# Patient Record
Sex: FEMALE | URBAN - METROPOLITAN AREA
[De-identification: names, ages, dates, MRNs, and addresses within clinical notes are randomized per-mention and may not be internally consistent; named-entity substitution may affect disease eponyms.]

---

## 2023-05-09 ENCOUNTER — APPOINTMENT (RX ONLY)
Dept: URBAN - METROPOLITAN AREA CLINIC 55 | Facility: CLINIC | Age: 28
Setting detail: DERMATOLOGY
End: 2023-05-09

## 2023-05-09 DIAGNOSIS — Z41.9 ENCOUNTER FOR PROCEDURE FOR PURPOSES OTHER THAN REMEDYING HEALTH STATE, UNSPECIFIED: ICD-10-CM

## 2023-05-09 PROCEDURE — ? COSMETIC CONSULTATION: GENERAL

## 2023-05-09 PROCEDURE — ? INVENTORY

## 2023-05-09 PROCEDURE — ? IN-HOUSE DISPENSING PHARMACY

## 2023-05-09 NOTE — PROCEDURE: IN-HOUSE DISPENSING PHARMACY
Product 5 Units Dispensed: 0
Product 62 Unit Type: mg
Product 3 Amount/Unit (Numbers Only): 30
Product 5 Unit Type: ml
Name Of Product 5: Hydrating Tretinoin 0.025% Cream
Product 5 Application Directions: Apply nightly or as directed. Use SPF daily.
Render Refills If Set To 0: Yes
Product 3 Refills: 11
Product 1 Refills: 2
Name Of Product 1: Anti-Aging Brightening Cream
Product 7 Application Directions: Apply only as directed
Product 2 Application Directions: Apply nightly or as directed.
Name Of Product 4: Hydroquinone 8% Emulsion
Name Of Product 3: Acne Triple Combination Gel
Name Of Product 2: Dapsone / Spironolactone Gel
Product 2 Units Dispensed: 1
Name Of Product 6: Rosacea Triple Combination Gel
Detail Level: Zone
Name Of Product 7: Lidocaine 23% / Tetracaine 7% Cream
Send Charges To Patient Encounter: No

## 2023-05-24 ENCOUNTER — APPOINTMENT (RX ONLY)
Dept: URBAN - METROPOLITAN AREA CLINIC 55 | Facility: CLINIC | Age: 28
Setting detail: DERMATOLOGY
End: 2023-05-24

## 2023-05-24 DIAGNOSIS — Z41.9 ENCOUNTER FOR PROCEDURE FOR PURPOSES OTHER THAN REMEDYING HEALTH STATE, UNSPECIFIED: ICD-10-CM

## 2023-05-24 PROCEDURE — ? INVENTORY

## 2023-05-24 PROCEDURE — ? PALOMAR VECTUS LASER HAIR REMOVAL

## 2023-05-24 NOTE — PROCEDURE: PALOMAR VECTUS LASER HAIR REMOVAL
Topical Anesthesia Type: 20% benzocaine, 8% lidocaine, 4% tetracaine
Optic Size: 12 x 12mm
External Cooling Fan Speed: 0
Skintel Number (Optional): 19
Rate (Hz): Medium
Cooling: DCD setting
Pulse Duration (Include Units): 14 ms
Post-Care Instructions: I reviewed with the patient in detail post-care instructions. Patient should avoid sun for a minimum of 4 weeks before and after treatment.
Post-Procedure Care: Immediate endpoint: perifollicular erythema and edema. Post care was reviewed with the patient and all patient questions were answered to their satisfaction.
Treatment Number: 1
Laser Type: diode 810nm
Detail Level: Detailed
Fluence In J/Cm2: 22
Render Post-Care In The Note: No
Pre-Procedure: Prior to proceeding the treatment areas were cleaned and all present put on their eye protection.
Consent obtained, risks reviewed.

## 2023-06-21 ENCOUNTER — APPOINTMENT (RX ONLY)
Dept: URBAN - METROPOLITAN AREA CLINIC 55 | Facility: CLINIC | Age: 28
Setting detail: DERMATOLOGY
End: 2023-06-21

## 2023-06-21 DIAGNOSIS — Z41.9 ENCOUNTER FOR PROCEDURE FOR PURPOSES OTHER THAN REMEDYING HEALTH STATE, UNSPECIFIED: ICD-10-CM

## 2023-06-21 PROCEDURE — ? INVENTORY

## 2023-06-21 PROCEDURE — ? PALOMAR VECTUS LASER HAIR REMOVAL

## 2023-06-21 NOTE — PROCEDURE: PALOMAR VECTUS LASER HAIR REMOVAL
Cooling: DCD setting
Post-Procedure Care: Immediate endpoint: perifollicular erythema and edema. Post care was reviewed with the patient and all patient questions were answered to their satisfaction.
Fluence In J/Cm2: 23
Pulse Duration (Include Units): 15 ms
Treatment Number: 2
Post-Care Instructions: I reviewed with the patient in detail post-care instructions. Patient should avoid sun for a minimum of 4 weeks before and after treatment.
Rate (Hz): Medium
Render Post-Care In The Note: No
Detail Level: Detailed
Optic Size: 12 x 12mm
Skintel Number (Optional): 13
Laser Type: diode 810nm
External Cooling Fan Speed: 0
Pre-Procedure: Prior to proceeding the treatment areas were cleaned and all present put on their eye protection.
Topical Anesthesia Type: 20% benzocaine, 8% lidocaine, 4% tetracaine
Consent obtained, risks reviewed.

## 2023-07-20 ENCOUNTER — APPOINTMENT (RX ONLY)
Dept: URBAN - METROPOLITAN AREA CLINIC 55 | Facility: CLINIC | Age: 28
Setting detail: DERMATOLOGY
End: 2023-07-20

## 2023-07-20 DIAGNOSIS — Z41.9 ENCOUNTER FOR PROCEDURE FOR PURPOSES OTHER THAN REMEDYING HEALTH STATE, UNSPECIFIED: ICD-10-CM

## 2023-07-20 PROCEDURE — ? PALOMAR VECTUS LASER HAIR REMOVAL

## 2023-07-20 PROCEDURE — ? INVENTORY

## 2023-07-20 NOTE — PROCEDURE: PALOMAR VECTUS LASER HAIR REMOVAL
Rate (Hz): Medium
Treatment Number: 3
Cooling: DCD setting
Post-Care Instructions: I reviewed with the patient in detail post-care instructions. Patient should avoid sun for a minimum of 4 weeks before and after treatment.
Fluence In J/Cm2: 24
Pulse Duration (Include Units): 16 ms
Optic Size: 12 x 12mm
Detail Level: Detailed
Topical Anesthesia Type: 20% benzocaine, 8% lidocaine, 4% tetracaine
Pre-Procedure: Prior to proceeding the treatment areas were cleaned and all present put on their eye protection.
Post-Procedure Care: Immediate endpoint: perifollicular erythema and edema. Post care was reviewed with the patient and all patient questions were answered to their satisfaction.
External Cooling Fan Speed: 0
Skintel Number (Optional): 13
Render Post-Care In The Note: No
Laser Type: diode 810nm
Consent obtained, risks reviewed.

## 2023-08-17 ENCOUNTER — APPOINTMENT (RX ONLY)
Dept: URBAN - METROPOLITAN AREA CLINIC 55 | Facility: CLINIC | Age: 28
Setting detail: DERMATOLOGY
End: 2023-08-17

## 2023-08-17 DIAGNOSIS — Z41.9 ENCOUNTER FOR PROCEDURE FOR PURPOSES OTHER THAN REMEDYING HEALTH STATE, UNSPECIFIED: ICD-10-CM

## 2023-08-17 PROCEDURE — ? PALOMAR VECTUS LASER HAIR REMOVAL

## 2023-08-17 PROCEDURE — ? INVENTORY

## 2023-08-17 NOTE — PROCEDURE: PALOMAR VECTUS LASER HAIR REMOVAL
Post-Procedure Care: Immediate endpoint: perifollicular erythema and edema. Post care was reviewed with the patient and all patient questions were answered to their satisfaction.
Treatment Number: 3
External Cooling Fan Speed: 0
Laser Type: diode 810nm
Post-Care Instructions: I reviewed with the patient in detail post-care instructions. Patient should avoid sun for a minimum of 4 weeks before and after treatment.
Topical Anesthesia Type: 20% benzocaine, 8% lidocaine, 4% tetracaine
Pre-Procedure: Prior to proceeding the treatment areas were cleaned and all present put on their eye protection.
Optic Size: 12 x 12mm
Pulse Duration (Include Units): 17 ms
Skintel Number (Optional): 15
Rate (Hz): Medium
Consent obtained, risks reviewed.
Fluence In J/Cm2: 26
Cooling: DCD setting
Detail Level: Detailed
Render Post-Care In The Note: No

## 2023-09-14 ENCOUNTER — APPOINTMENT (RX ONLY)
Dept: URBAN - METROPOLITAN AREA CLINIC 55 | Facility: CLINIC | Age: 28
Setting detail: DERMATOLOGY
End: 2023-09-14

## 2023-09-14 DIAGNOSIS — Z41.9 ENCOUNTER FOR PROCEDURE FOR PURPOSES OTHER THAN REMEDYING HEALTH STATE, UNSPECIFIED: ICD-10-CM

## 2023-09-14 PROCEDURE — ? INVENTORY

## 2023-09-14 PROCEDURE — ? PALOMAR VECTUS LASER HAIR REMOVAL

## 2023-09-14 NOTE — PROCEDURE: PALOMAR VECTUS LASER HAIR REMOVAL
Consent obtained, risks reviewed.
Render Post-Care In The Note: No
Optic Size: 12 x 12mm
External Cooling Fan Speed: 0
Pre-Procedure: Prior to proceeding the treatment areas were cleaned and all present put on their eye protection.
Skintel Number (Optional): 14
Topical Anesthesia Type: 20% benzocaine, 8% lidocaine, 4% tetracaine
Rate (Hz): Medium
Pulse Duration (Include Units): 16 ms
Cooling: DCD setting
Laser Type: diode 810nm
Post-Procedure Care: Immediate endpoint: perifollicular erythema and edema. Post care was reviewed with the patient and all patient questions were answered to their satisfaction.
Post-Care Instructions: I reviewed with the patient in detail post-care instructions. Patient should avoid sun for a minimum of 4 weeks before and after treatment.
Detail Level: Detailed
Treatment Number: 5
Fluence In J/Cm2: 25